# Patient Record
Sex: MALE | Race: WHITE
[De-identification: names, ages, dates, MRNs, and addresses within clinical notes are randomized per-mention and may not be internally consistent; named-entity substitution may affect disease eponyms.]

---

## 2017-04-19 ENCOUNTER — APPOINTMENT (OUTPATIENT)
Dept: CARDIOLOGY | Facility: CLINIC | Age: 22
End: 2017-04-19

## 2018-07-02 ENCOUNTER — APPOINTMENT (OUTPATIENT)
Dept: PEDIATRIC HEMATOLOGY/ONCOLOGY | Facility: CLINIC | Age: 23
End: 2018-07-02
Payer: COMMERCIAL

## 2018-07-02 VITALS
HEART RATE: 48 BPM | SYSTOLIC BLOOD PRESSURE: 129 MMHG | BODY MASS INDEX: 22.34 KG/M2 | HEIGHT: 71.38 IN | TEMPERATURE: 98.6 F | DIASTOLIC BLOOD PRESSURE: 74 MMHG | WEIGHT: 161.38 LBS

## 2018-07-02 PROCEDURE — 99215 OFFICE O/P EST HI 40 MIN: CPT

## 2018-11-28 NOTE — PHYSICAL EXAM
[Mediport] : Mediport [Scars ___] : scars [unfilled] [Normal] : affect appropriate [100: Normal, no complaints, no evidence of disease.] : 100: Normal, no complaints, no evidence of disease. [de-identified] : contact lenses [de-identified] : Scar [de-identified] : Teofilo 5, circumcised [de-identified] : Right chest mediport scar

## 2018-11-28 NOTE — HISTORY OF PRESENT ILLNESS
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  Since his last visit in the survivorship program on November 30, 2016, Mairo has been generally well without persistent fevers, weight loss or bone pain.  He lives with his father, stepmother, half brother and step sister. 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 Mario enjoys playing basketball and works out at the gym 4-5 times per week.  He reported his diet as healthy, with little fast food. [de-identified] : 90 mg/m2 [de-identified] : 1,000mg/m2 [de-identified] : Yes [de-identified] : Yes [de-identified] : Yes [de-identified] : Yes [de-identified] : Yes [de-identified] : Yes [de-identified] : Yes [de-identified] : Yes [de-identified] : Yes

## 2019-05-22 ENCOUNTER — APPOINTMENT (OUTPATIENT)
Dept: PEDIATRIC HEMATOLOGY/ONCOLOGY | Facility: CLINIC | Age: 24
End: 2019-05-22
Payer: COMMERCIAL

## 2019-05-22 VITALS
SYSTOLIC BLOOD PRESSURE: 130 MMHG | HEIGHT: 71.65 IN | BODY MASS INDEX: 23 KG/M2 | DIASTOLIC BLOOD PRESSURE: 77 MMHG | HEART RATE: 45 BPM | WEIGHT: 167.99 LBS

## 2019-05-22 DIAGNOSIS — Z91.89 OTHER SPECIFIED PERSONAL RISK FACTORS, NOT ELSEWHERE CLASSIFIED: ICD-10-CM

## 2019-05-22 DIAGNOSIS — Z92.21 PERSONAL HISTORY OF ANTINEOPLASTIC CHEMOTHERAPY: ICD-10-CM

## 2019-05-22 DIAGNOSIS — Z85.6 PERSONAL HISTORY OF LEUKEMIA: ICD-10-CM

## 2019-05-22 DIAGNOSIS — Z09 ENCOUNTER FOR FOLLOW-UP EXAMINATION AFTER COMPLETED TREATMENT FOR CONDITIONS OTHER THAN MALIGNANT NEOPLASM: ICD-10-CM

## 2019-05-22 PROCEDURE — 99215 OFFICE O/P EST HI 40 MIN: CPT

## 2019-05-23 PROBLEM — Z85.6 HISTORY OF ACUTE LYMPHOBLASTIC LEUKEMIA (ALL): Status: ACTIVE | Noted: 2018-07-03

## 2019-05-23 NOTE — HISTORY OF PRESENT ILLNESS
[de-identified] : 24 year-old young man with a history of pre B-cell lymphoblastic leukemia who is now 14.6 years off-therapy.  Since his last visit in the survivorship program on July 2, 2018, Mario has been generally well without persistent fevers, weight loss or bone pain.  His post treatment course has been uncomplicated. \par \par He lives with his father, stepmother, half brother and step sister. Mario is currently working at [employer] in [employer], 3 times per week and recently completed his spring semester at [employer]. Mario enjoys playing basketball and works out at the gym 5-6 times per week.  He reported his diet as healthy, with little fast food. [de-identified] : 90 mg/m2 [de-identified] : 1,000mg/m2 [de-identified] : Yes [de-identified] : Yes [de-identified] : Yes [de-identified] : Yes [de-identified] : Yes [de-identified] : Yes [de-identified] : Yes [de-identified] : Yes [de-identified] : Yes

## 2019-05-23 NOTE — CONSULT LETTER
[Dear  ___] : Dear  [unfilled], [Courtesy Letter:] : I had the pleasure of seeing your patient, [unfilled], in my office today. [Please see my note below.] : Please see my note below. [Sincerely,] : Sincerely, [FreeTextEntry2] : Shayan Sheridan MD\par Pediatric & Adolescent Medicine\par [address]\[place] [address]\par office [phone]\par [phone]\par  [FreeTextEntry1] : Mario was seen for a follow up  visit in the Survivors Facing Forward Program at the [facility].\par  [FreeTextEntry3] : JASON Jay\par [facility]\par [phone]

## 2019-05-23 NOTE — HISTORY OF PRESENT ILLNESS
[de-identified] : 24 year-old young man with a history of pre B-cell lymphoblastic leukemia who is now 14.6 years off-therapy.  Since his last visit in the survivorship program on July 2, 2018, Mario has been generally well without persistent fevers, weight loss or bone pain.  His post treatment course has been uncomplicated. \par \par He lives with his father, stepmother, half brother and step sister. Mario is currently working at [employer] in [place], 3 times per week and recently completed his spring semester at [employer]. Mario enjoys playing basketball and works out at the gym 5-6 times per week.  He reported his diet as healthy, with little fast food. [de-identified] : 90 mg/m2 [de-identified] : 1,000mg/m2 [de-identified] : Yes [de-identified] : Yes [de-identified] : Yes [de-identified] : Yes [de-identified] : Yes [de-identified] : Yes [de-identified] : Yes [de-identified] : Yes [de-identified] : Yes

## 2019-05-23 NOTE — PHYSICAL EXAM
[Mediport] : Mediport [Scars ___] : scars [unfilled] [Normal] : affect appropriate [100: Normal, no complaints, no evidence of disease.] : 100: Normal, no complaints, no evidence of disease. [de-identified] : Facial acne [de-identified] : Scar [de-identified] : Deferred: pt reported practicing self testicular examination [de-identified] : Right chest mediport scar

## 2019-05-23 NOTE — PHYSICAL EXAM
[Mediport] : Mediport [Scars ___] : scars [unfilled] [Normal] : affect appropriate [100: Normal, no complaints, no evidence of disease.] : 100: Normal, no complaints, no evidence of disease. [de-identified] : Facial acne [de-identified] : Scar [de-identified] : Deferred: pt reported practicing self testicular examination [de-identified] : Right chest mediport scar

## 2019-05-23 NOTE — CONSULT LETTER
[Dear  ___] : Dear  [unfilled], [Courtesy Letter:] : I had the pleasure of seeing your patient, [unfilled], in my office today. [Please see my note below.] : Please see my note below. [Sincerely,] : Sincerely, [FreeTextEntry2] : Shayan Sheridan MD\par Pediatric & Adolescent Medicine\par [address]\[place] [address]\par office [phone]\par [phone]\par  [FreeTextEntry1] : Mario was seen for a follow up  visit in the Survivors Facing Forward Program at the [facility].\par  [FreeTextEntry3] : JASON Jay\par [facility]\par [phone]

## 2019-05-23 NOTE — REVIEW OF SYSTEMS
PATIENT ARRIVED AMBULATORY TO ROOM WITH A LIMP. PATIENT C/O LEFT ANKLE PAIN. PATIENT STATES \"I THINK I ROLLED IT SKATEBOARDING YESTERDAY\" +SWELLING TO THE ANKLE. NO FOOT PAIN. CMS INTACT. NO OBVIOUS DEFORMITY. [Negative] : Allergic/Immunologic

## 2023-07-11 NOTE — END OF VISIT
[>50% of Time Spent on Counseling for ____] : Greater than 50% of the encounter time was spent on counseling for [unfilled] [Time Spent: ___ minutes] : I have spent [unfilled] minutes of face to face time with the patient and swelling for up to a year after a fracture. The patient will follow-up as needed. As this patient has demonstrated risk factors for osteoporosis, such as age and evidence of a fracture, I have referred the patient back to the primary care physician for evaluation for osteoporosis, including consideration for DEXA scanning, if this is felt to be clinically indicated. The patient is advised to contact the primary care physician to follow-up for further evaluation.         Orion Taylor, APRN - CNP